# Patient Record
Sex: FEMALE | Race: WHITE | NOT HISPANIC OR LATINO | Employment: UNEMPLOYED | URBAN - METROPOLITAN AREA
[De-identification: names, ages, dates, MRNs, and addresses within clinical notes are randomized per-mention and may not be internally consistent; named-entity substitution may affect disease eponyms.]

---

## 2018-10-04 ENCOUNTER — HOSPITAL ENCOUNTER (EMERGENCY)
Facility: HOSPITAL | Age: 23
Discharge: HOME | End: 2018-10-04
Attending: EMERGENCY MEDICINE | Admitting: EMERGENCY MEDICINE

## 2018-10-04 VITALS
RESPIRATION RATE: 16 BRPM | HEART RATE: 78 BPM | SYSTOLIC BLOOD PRESSURE: 121 MMHG | OXYGEN SATURATION: 100 % | WEIGHT: 227 LBS | HEIGHT: 63 IN | DIASTOLIC BLOOD PRESSURE: 83 MMHG | BODY MASS INDEX: 40.22 KG/M2 | TEMPERATURE: 98.1 F

## 2018-10-04 DIAGNOSIS — R55 POSTURAL SYNCOPE: Primary | ICD-10-CM

## 2018-10-04 DIAGNOSIS — R00.2 PALPITATIONS: ICD-10-CM

## 2018-10-04 LAB
ALBUMIN SERPL-MCNC: 5.1 G/DL (ref 3.5–5.2)
ALBUMIN/GLOB SERPL: 1.6 G/DL
ALP SERPL-CCNC: 75 U/L (ref 39–117)
ALT SERPL W P-5'-P-CCNC: 19 U/L (ref 1–33)
ANION GAP SERPL CALCULATED.3IONS-SCNC: 11.2 MMOL/L
AST SERPL-CCNC: 16 U/L (ref 1–32)
BACTERIA UR QL AUTO: ABNORMAL /HPF
BASOPHILS # BLD AUTO: 0.02 10*3/MM3 (ref 0–0.2)
BASOPHILS NFR BLD AUTO: 0.2 % (ref 0–1.5)
BILIRUB SERPL-MCNC: 0.2 MG/DL (ref 0.1–1.2)
BILIRUB UR QL STRIP: NEGATIVE
BUN BLD-MCNC: 13 MG/DL (ref 6–20)
BUN/CREAT SERPL: 19.1 (ref 7–25)
CALCIUM SPEC-SCNC: 9.8 MG/DL (ref 8.6–10.5)
CHLORIDE SERPL-SCNC: 101 MMOL/L (ref 98–107)
CLARITY UR: CLEAR
CO2 SERPL-SCNC: 27.8 MMOL/L (ref 22–29)
COLOR UR: YELLOW
CREAT BLD-MCNC: 0.68 MG/DL (ref 0.57–1)
DEPRECATED RDW RBC AUTO: 39.6 FL (ref 37–54)
EOSINOPHIL # BLD AUTO: 0.09 10*3/MM3 (ref 0–0.7)
EOSINOPHIL NFR BLD AUTO: 0.9 % (ref 0.3–6.2)
ERYTHROCYTE [DISTWIDTH] IN BLOOD BY AUTOMATED COUNT: 12.3 % (ref 11.7–13)
GFR SERPL CREATININE-BSD FRML MDRD: 108 ML/MIN/1.73
GLOBULIN UR ELPH-MCNC: 3.2 GM/DL
GLUCOSE BLD-MCNC: 108 MG/DL (ref 65–99)
GLUCOSE UR STRIP-MCNC: NEGATIVE MG/DL
HCG SERPL QL: NEGATIVE
HCT VFR BLD AUTO: 41.2 % (ref 35.6–45.5)
HGB BLD-MCNC: 13.9 G/DL (ref 11.9–15.5)
HGB UR QL STRIP.AUTO: NEGATIVE
HOLD SPECIMEN: NORMAL
HOLD SPECIMEN: NORMAL
HYALINE CASTS UR QL AUTO: ABNORMAL /LPF
IMM GRANULOCYTES # BLD: 0.03 10*3/MM3 (ref 0–0.03)
IMM GRANULOCYTES NFR BLD: 0.3 % (ref 0–0.5)
KETONES UR QL STRIP: NEGATIVE
LEUKOCYTE ESTERASE UR QL STRIP.AUTO: ABNORMAL
LYMPHOCYTES # BLD AUTO: 3.21 10*3/MM3 (ref 0.9–4.8)
LYMPHOCYTES NFR BLD AUTO: 31.9 % (ref 19.6–45.3)
MCH RBC QN AUTO: 29.3 PG (ref 26.9–32)
MCHC RBC AUTO-ENTMCNC: 33.7 G/DL (ref 32.4–36.3)
MCV RBC AUTO: 86.7 FL (ref 80.5–98.2)
MONOCYTES # BLD AUTO: 0.56 10*3/MM3 (ref 0.2–1.2)
MONOCYTES NFR BLD AUTO: 5.6 % (ref 5–12)
NEUTROPHILS # BLD AUTO: 6.17 10*3/MM3 (ref 1.9–8.1)
NEUTROPHILS NFR BLD AUTO: 61.4 % (ref 42.7–76)
NITRITE UR QL STRIP: NEGATIVE
PH UR STRIP.AUTO: 7 [PH] (ref 5–8)
PLATELET # BLD AUTO: 339 10*3/MM3 (ref 140–500)
PMV BLD AUTO: 9.5 FL (ref 6–12)
POTASSIUM BLD-SCNC: 4.2 MMOL/L (ref 3.5–5.2)
PROT SERPL-MCNC: 8.3 G/DL (ref 6–8.5)
PROT UR QL STRIP: NEGATIVE
RBC # BLD AUTO: 4.75 10*6/MM3 (ref 3.9–5.2)
RBC # UR: ABNORMAL /HPF
REF LAB TEST METHOD: ABNORMAL
SODIUM BLD-SCNC: 140 MMOL/L (ref 136–145)
SP GR UR STRIP: 1.01 (ref 1–1.03)
SQUAMOUS #/AREA URNS HPF: ABNORMAL /HPF
UROBILINOGEN UR QL STRIP: ABNORMAL
WBC NRBC COR # BLD: 10.05 10*3/MM3 (ref 4.5–10.7)
WBC UR QL AUTO: ABNORMAL /HPF
WHOLE BLOOD HOLD SPECIMEN: NORMAL
WHOLE BLOOD HOLD SPECIMEN: NORMAL

## 2018-10-04 PROCEDURE — 93010 ELECTROCARDIOGRAM REPORT: CPT | Performed by: INTERNAL MEDICINE

## 2018-10-04 PROCEDURE — 99284 EMERGENCY DEPT VISIT MOD MDM: CPT

## 2018-10-04 PROCEDURE — 87086 URINE CULTURE/COLONY COUNT: CPT | Performed by: PHYSICIAN ASSISTANT

## 2018-10-04 PROCEDURE — 80053 COMPREHEN METABOLIC PANEL: CPT | Performed by: PHYSICIAN ASSISTANT

## 2018-10-04 PROCEDURE — 85025 COMPLETE CBC W/AUTO DIFF WBC: CPT | Performed by: PHYSICIAN ASSISTANT

## 2018-10-04 PROCEDURE — 81001 URINALYSIS AUTO W/SCOPE: CPT | Performed by: PHYSICIAN ASSISTANT

## 2018-10-04 PROCEDURE — 93005 ELECTROCARDIOGRAM TRACING: CPT | Performed by: PHYSICIAN ASSISTANT

## 2018-10-04 PROCEDURE — 84703 CHORIONIC GONADOTROPIN ASSAY: CPT | Performed by: PHYSICIAN ASSISTANT

## 2018-10-04 RX ORDER — CHOLECALCIFEROL (VITAMIN D3) 125 MCG
10 CAPSULE ORAL
COMMUNITY

## 2018-10-04 RX ORDER — SODIUM CHLORIDE 0.9 % (FLUSH) 0.9 %
10 SYRINGE (ML) INJECTION AS NEEDED
Status: DISCONTINUED | OUTPATIENT
Start: 2018-10-04 | End: 2018-10-05 | Stop reason: HOSPADM

## 2018-10-04 RX ADMIN — SODIUM CHLORIDE 1000 ML: 9 INJECTION, SOLUTION INTRAVENOUS at 21:36

## 2018-10-05 NOTE — DISCHARGE INSTRUCTIONS
Avoid caffeine.  Stay hydrated (8-10 glasses of water 8-10oz/day)  Return to the ER with any further concerns, should your condition reoccur, or should you develop a fever.

## 2018-10-05 NOTE — ED PROVIDER NOTES
" EMERGENCY DEPARTMENT ENCOUNTER    CHIEF COMPLAINT  Chief Complaint: syncope  History given by: Patient, patients friend  History limited by: none  Room Number: 14/14  PMD: Provider, No Known      HPI:  Pt is a 23 y.o. female who presents complaining of syncope that happened around noon when she stood up to go to the bathroom. Pt reports still feeling \"off\", and that this feeling has been consistent for couple of weeks but thought it was relating to stress. Pt reports feeling nauseous before incident, feeling weird like \"static on a TV\", palpitations, and edema in the legs that progresses as the day goes on . Pt denies hitting head, chest pain, blurred vision,  Pain in the extremities, and SOA. LMP was four months ago, pt reports HX of PCOS and seasonal asthma. Pt's friend reports patient was unconscious for one minute, and that pt was confused after incident.    Duration:  Several hours  Onset: gradual  Timing: one minute  Quality: feels like \"static on TV\"  Intensity/Severity: moderate  Progression: resolved  Associated Symptoms: nausea, feeling weird like \"static on a TV\", palpitations, and edema in the legs  Aggravating Factors: standing  Alleviating Factors: none  Previous Episodes: none  Treatment before arrival: none    PAST MEDICAL HISTORY  Active Ambulatory Problems     Diagnosis Date Noted   • No Active Ambulatory Problems     Resolved Ambulatory Problems     Diagnosis Date Noted   • No Resolved Ambulatory Problems     Past Medical History:   Diagnosis Date   • Asthma        PAST SURGICAL HISTORY  Past Surgical History:   Procedure Laterality Date   • TONSILLECTOMY         FAMILY HISTORY  History reviewed. No pertinent family history.    SOCIAL HISTORY  Social History     Social History   • Marital status: Single     Spouse name: N/A   • Number of children: N/A   • Years of education: N/A     Occupational History   • Not on file.     Social History Main Topics   • Smoking status: Former Smoker   • " Smokeless tobacco: Not on file   • Alcohol use No   • Drug use: No   • Sexual activity: Not on file     Other Topics Concern   • Not on file     Social History Narrative   • No narrative on file       ALLERGIES  Penicillins    REVIEW OF SYSTEMS  Review of Systems   Constitutional: Negative for fever.   HENT: Negative for sore throat.    Eyes: Negative.    Respiratory: Negative for cough and shortness of breath.    Cardiovascular: Positive for palpitations. Negative for chest pain.   Gastrointestinal: Positive for nausea. Negative for abdominal pain, diarrhea and vomiting.   Genitourinary: Negative for dysuria.   Musculoskeletal: Negative for neck pain.   Skin: Negative for rash.   Allergic/Immunologic: Negative.    Neurological: Positive for syncope. Negative for seizures, weakness, numbness and headaches.   Hematological: Negative.    Psychiatric/Behavioral: Negative.    All other systems reviewed and are negative.      PHYSICAL EXAM  ED Triage Vitals   Temp Heart Rate Resp BP SpO2   10/04/18 2048 10/04/18 2048 10/04/18 2048 10/04/18 2054 10/04/18 2048   98.1 °F (36.7 °C) 91 16 144/98 100 %      Temp src Heart Rate Source Patient Position BP Location FiO2 (%)   10/04/18 2048 10/04/18 2048 10/04/18 2054 10/04/18 2054 --   Tympanic Monitor Sitting Right arm        Physical Exam   Constitutional: She is oriented to person, place, and time. No distress.   HENT:   Head: Normocephalic and atraumatic.   Eyes: Pupils are equal, round, and reactive to light. EOM are normal.   Neck: Normal range of motion. Neck supple.   Cardiovascular: Normal rate, regular rhythm and normal heart sounds.    Pulmonary/Chest: Effort normal and breath sounds normal. No respiratory distress.   Abdominal: Soft. There is no tenderness. There is no rebound and no guarding.   Musculoskeletal: Normal range of motion. She exhibits no edema.   Neurological: She is alert and oriented to person, place, and time. She has normal sensation and normal  strength.   Skin: Skin is warm and dry. No rash noted.   Psychiatric: Mood and affect normal.   Nursing note and vitals reviewed.      LAB RESULTS  Lab Results (last 24 hours)     Procedure Component Value Units Date/Time    CBC & Differential [[id number]] Collected:  10/04/18 2109    Specimen:  Blood Updated:  10/04/18 2121    Narrative:       The following orders were created for panel order CBC & Differential.  Procedure                               Abnormality         Status                     ---------                               -----------         ------                     CBC Auto Differential[[id number]]         Normal              Final result                 Please view results for these tests on the individual orders.    Comprehensive Metabolic Panel [[id number]]  (Abnormal) Collected:  10/04/18 2109    Specimen:  Blood Updated:  10/04/18 2141     Glucose 108 (H) mg/dL      BUN 13 mg/dL      Creatinine 0.68 mg/dL      Sodium 140 mmol/L      Potassium 4.2 mmol/L      Chloride 101 mmol/L      CO2 27.8 mmol/L      Calcium 9.8 mg/dL      Total Protein 8.3 g/dL      Albumin 5.10 g/dL      ALT (SGPT) 19 U/L      AST (SGOT) 16 U/L      Alkaline Phosphatase 75 U/L      Total Bilirubin 0.2 mg/dL      eGFR Non African Amer 108 mL/min/1.73      Globulin 3.2 gm/dL      A/G Ratio 1.6 g/dL      BUN/Creatinine Ratio 19.1     Anion Gap 11.2 mmol/L     CBC Auto Differential [[id number]]  (Normal) Collected:  10/04/18 2109    Specimen:  Blood Updated:  10/04/18 2121     WBC 10.05 10*3/mm3      RBC 4.75 10*6/mm3      Hemoglobin 13.9 g/dL      Hematocrit 41.2 %      MCV 86.7 fL      MCH 29.3 pg      MCHC 33.7 g/dL      RDW 12.3 %      RDW-SD 39.6 fl      MPV 9.5 fL      Platelets 339 10*3/mm3      Neutrophil % 61.4 %      Lymphocyte % 31.9 %      Monocyte % 5.6 %      Eosinophil % 0.9 %      Basophil % 0.2 %      Immature Grans % 0.3 %      Neutrophils, Absolute 6.17 10*3/mm3      Lymphocytes, Absolute 3.21 10*3/mm3       Monocytes, Absolute 0.56 10*3/mm3      Eosinophils, Absolute 0.09 10*3/mm3      Basophils, Absolute 0.02 10*3/mm3      Immature Grans, Absolute 0.03 10*3/mm3     hCG, Serum, Qualitative [[id number]]  (Normal) Collected:  10/04/18 2217    Specimen:  Blood Updated:  10/04/18 2259     HCG Qualitative Negative    Urinalysis With Microscopic If Indicated (No Culture) - Urine, Clean Catch [[id number]]  (Abnormal) Collected:  10/04/18 2226    Specimen:  Urine from Urine, Clean Catch Updated:  10/04/18 2240     Color, UA Yellow     Appearance, UA Clear     pH, UA 7.0     Specific Gravity, UA 1.015     Glucose, UA Negative     Ketones, UA Negative     Bilirubin, UA Negative     Blood, UA Negative     Protein, UA Negative     Leuk Esterase, UA Moderate (2+) (A)     Nitrite, UA Negative     Urobilinogen, UA 0.2 E.U./dL    Urinalysis, Microscopic Only - Urine, Clean Catch [[id number]]  (Abnormal) Collected:  10/04/18 2226    Specimen:  Urine from Urine, Clean Catch Updated:  10/04/18 2240     RBC, UA 3-5 (A) /HPF      WBC, UA 6-12 (A) /HPF      Bacteria, UA 1+ (A) /HPF      Squamous Epithelial Cells, UA 3-6 (A) /HPF      Hyaline Casts, UA 0-2 /LPF      Methodology Automated Microscopy    Urine Culture - Urine, [[id number]] Collected:  10/04/18 2226    Specimen:  Urine from Urine, Clean Catch Updated:  10/04/18 2346          I ordered the above labs and reviewed the results      PROCEDURES  Procedures  See MD note for interpretation of EKG.    PROGRESS AND CONSULTS  9:46 PM  Reviewed pt's history and workup with Dr. Wren.  After a bedside evaluation; Dr Wren agrees with the plan of care    11:16 PM  Rechecked pt. Pt is resting comfortably. Notified pt of labs results. Discussed the plan to discharge.             MEDICAL DECISION MAKING  Results were reviewed/discussed with the patient and they were also made aware of online access. Pt also made aware that some labs, such as cultures, will not be resulted during ER visit and  follow up with PMD is necessary.     MDM  Number of Diagnoses or Management Options     Amount and/or Complexity of Data Reviewed  Clinical lab tests: ordered and reviewed (UA: WBC 6-12, Bacteria 1+. will culture)           DIAGNOSIS  Final diagnoses:   Postural syncope   Palpitations       DISPOSITION  DISCHARGE    Patient discharged in stable condition.    Reviewed implications of results, diagnosis, meds, responsibility to follow up, warning signs and symptoms of possible worsening, potential complications and reasons to return to ER.    Patient/Family voiced understanding of above instructions.    Discussed plan for discharge, as there is no emergent indication for admission. Patient referred to primary care provider for BP management due to today's BP. Pt/family is agreeable and understands need for follow up and repeat testing.  Pt is aware that discharge does not mean that nothing is wrong but it indicates no emergency is present that requires admission and they must continue care with follow-up as given below or physician of their choice.     FOLLOW-UP  [facility] CARDIOLOG[facility]  [address]  [facility] [id number] [phone]  Schedule an appointment as soon as possible for a visit   For further evaluation and treatment         Medication List      No changes were made to your prescriptions during this visit.           Latest Documented Vital Signs:  As of 5:39 AM  BP- 121/83 HR- 78 Temp- 98.1 °F (36.7 °C) (Tympanic) O2 sat- 100%    --  Documentation assistance provided by yasmeen Patel for Jose Jerome PA-C.  Information recorded by the yasmeen was done at my direction and has been verified and validated by me.     Jazmyn Patel  10/04/18 3197       Jazmyn Patel  10/04/18 6167       Gorge Jerome III, PA  10/05/18 5745

## 2018-10-05 NOTE — ED PROVIDER NOTES
"Pt states that this morning, when she awoke, she felt unwell, lightheaded, and nauseated. She reports that after she stood from her chair, her sx worsened and then she had a syncopal episode that was witnessed by family (did not sustain head injury or any other trauma). After the episode, she had brief \"irregular\" palpitations that have since resolved. She denies abd pain, vomiting, diarrhea, sweating, pain and difficulty with urination, black or bloody stools, headache, focal weakness, numbness, chest pain, and dyspnea.      Limited physical exam: heart is regular rate and rhythm, lungs are CTAB, abd is soft and nontender without rebound or guarding, nonfocal neuro exam    EKG           EKG time: 2123  Rhythm/Rate: sinus rhythm, rate= 71  P waves and WY: normal  QRS, axis: normal   ST and T waves: normal   QT is not prolonged; WY interval is not short; no signs of Brugada syndrome    Interpreted Contemporaneously by me, independently viewed  unchanged compared to prior 11/17/11    Plan is to administer IV fluids for hydration, to check orthostatic vitals, and to order blood work, UA, EKG, and HCG qualitative for further evaluation-> Orthostatic vitals are unremarkable, WBC count is normal, hgb is normal, and blood work is otherwise stable. Will likely discharge pt and have her follow up with PMD for recheck if she is able to ambulate in ED without complication.       MD ATTESTATION NOTE    The AUDELIA and I have discussed this patient's history, physical exam, and treatment plan.  I have reviewed the documentation and personally had a face to face interaction with the patient. I affirm the documentation and agree with the treatment and plan.  The attached note describes my personal findings.      Documentation assistance provided by yasmeen Flores for Dr Wren. Information recorded by the yasmeen was done at my direction and has been verified and validated by me.      Otoniel Flores  10/04/18 2202       Holger, " MD Chencho  10/04/18 3729

## 2018-10-05 NOTE — ED NOTES
Pt. States that she was feeling naueus around noon and then when she was getting up because she felt she was going to throw up she fell.     Holli Mcwilliams RN  10/04/18 1639

## 2018-10-06 LAB — BACTERIA SPEC AEROBE CULT: NORMAL

## 2018-10-10 ENCOUNTER — HOSPITAL ENCOUNTER (EMERGENCY)
Facility: HOSPITAL | Age: 23
Discharge: HOME | End: 2018-10-10
Attending: EMERGENCY MEDICINE | Admitting: EMERGENCY MEDICINE

## 2018-10-10 VITALS
HEART RATE: 87 BPM | RESPIRATION RATE: 18 BRPM | DIASTOLIC BLOOD PRESSURE: 84 MMHG | OXYGEN SATURATION: 99 % | HEIGHT: 63 IN | WEIGHT: 216.2 LBS | TEMPERATURE: 97.8 F | SYSTOLIC BLOOD PRESSURE: 133 MMHG | BODY MASS INDEX: 38.31 KG/M2

## 2018-10-10 DIAGNOSIS — R45.1 STRESS REACTION WITH PSYCHOMOTOR AGITATION: Primary | ICD-10-CM

## 2018-10-10 DIAGNOSIS — F43.9 STRESS REACTION WITH PSYCHOMOTOR AGITATION: Primary | ICD-10-CM

## 2018-10-10 LAB
HOLD SPECIMEN: NORMAL
HOLD SPECIMEN: NORMAL
WHOLE BLOOD HOLD SPECIMEN: NORMAL
WHOLE BLOOD HOLD SPECIMEN: NORMAL

## 2018-10-10 PROCEDURE — 99283 EMERGENCY DEPT VISIT LOW MDM: CPT

## 2018-10-10 PROCEDURE — 93010 ELECTROCARDIOGRAM REPORT: CPT | Performed by: INTERNAL MEDICINE

## 2018-10-10 PROCEDURE — 93005 ELECTROCARDIOGRAM TRACING: CPT | Performed by: EMERGENCY MEDICINE

## 2018-10-10 RX ORDER — SODIUM CHLORIDE 0.9 % (FLUSH) 0.9 %
10 SYRINGE (ML) INJECTION AS NEEDED
Status: DISCONTINUED | OUTPATIENT
Start: 2018-10-10 | End: 2018-10-10 | Stop reason: HOSPADM

## 2018-10-10 NOTE — ED PROVIDER NOTES
EMERGENCY DEPARTMENT ENCOUNTER    CHIEF COMPLAINT  Chief Complaint: chest pain  History given by: Patient  History limited by: N/A  Room Number:   PMD: Provider, No Known      HPI:  Pt is a 23 y.o. female who presents complaining of midsternal chest pain which began PTA after getting agitated while working on a home-improvement project with her boyfriend.  Pt reports she became agitated with her boyfriend after he incorrectly placed a baseboard.  Pt reports recent history of episodes of agitation with increase in HR, tunnel vision, lightheadedness, and chest pain.  Pt reports these sx have resolved.  Pt reports she was seen in ER 10/4 for syncope and was told upon discharge to return to ER with chest pain.  Pt reports she is currently asymptomatic.    Duration:  PTA  Onset: gradual  Timin episode  Location: midsternal  Radiation: none  Quality: sharp pain  Intensity/Severity: moderate  Progression: resolved  Associated Symptoms: agitation, tunnel vision, lightheadedness with episode, resolved  Aggravating Factors: agitation  Alleviating Factors: none  Previous Episodes: Pt reports recent history of episodes of agitation with increase in HR, tunnel vision, lightheadedness, and chest pain.   Treatment before arrival: none    PAST MEDICAL HISTORY  Active Ambulatory Problems     Diagnosis Date Noted   • No Active Ambulatory Problems     Resolved Ambulatory Problems     Diagnosis Date Noted   • No Resolved Ambulatory Problems     Past Medical History:   Diagnosis Date   • Asthma    • PCOS (polycystic ovarian syndrome)        PAST SURGICAL HISTORY  Past Surgical History:   Procedure Laterality Date   • TONSILLECTOMY         FAMILY HISTORY  History reviewed. No pertinent family history.    SOCIAL HISTORY  Social History     Social History   • Marital status: Single     Spouse name: N/A   • Number of children: N/A   • Years of education: N/A     Occupational History   • Not on file.     Social History Main Topics    • Smoking status: Former Smoker   • Smokeless tobacco: Not on file   • Alcohol use Yes      Comment: rarely    • Drug use: No   • Sexual activity: Yes     Other Topics Concern   • Not on file     Social History Narrative   • No narrative on file       ALLERGIES  Penicillins    REVIEW OF SYSTEMS  Review of Systems   Constitutional: Negative for fever.   HENT: Negative for sore throat.    Eyes: Positive for visual disturbance (tunnel vision with episode of agitation, resolved).   Respiratory: Negative for cough and shortness of breath.    Cardiovascular: Positive for chest pain (midsternal, sharp, with episode of agitation, resolved).   Gastrointestinal: Negative for abdominal pain, diarrhea and vomiting.   Genitourinary: Negative for dysuria.   Musculoskeletal: Negative for neck pain.   Skin: Negative for rash.   Allergic/Immunologic: Negative.    Neurological: Positive for light-headedness (with episode of agitation, resolved). Negative for weakness, numbness and headaches.   Hematological: Negative.    Psychiatric/Behavioral: Positive for agitation.   All other systems reviewed and are negative.      PHYSICAL EXAM  ED Triage Vitals [10/10/18 1349]   Temp Heart Rate Resp BP SpO2   97.8 °F (36.6 °C) 90 18 -- 99 %      Temp src Heart Rate Source Patient Position BP Location FiO2 (%)   Tympanic Monitor -- -- --       Physical Exam   Constitutional: She is oriented to person, place, and time. No distress.   HENT:   Head: Normocephalic and atraumatic.   Eyes: Pupils are equal, round, and reactive to light. EOM are normal.   Neck: Normal range of motion. Neck supple.   Cardiovascular: Normal rate, regular rhythm and normal heart sounds.    No murmur heard.  Pulmonary/Chest: Effort normal and breath sounds normal. No respiratory distress.   Abdominal: Soft. There is no tenderness. There is no rebound and no guarding.   Musculoskeletal: Normal range of motion. She exhibits no edema.   Neurological: She is alert and  oriented to person, place, and time. She has normal sensation and normal strength.   Skin: Skin is warm and dry. No rash noted.   Psychiatric: Mood and affect normal.   Nursing note and vitals reviewed.      PROCEDURES  Procedures    EKG           EKG time: 1052  Rhythm/Rate: NSR, 69  P waves and MS: normal  QRS, axis: normal   ST and T waves: normal     Interpreted Contemporaneously by me, independently viewed  unchanged compared to prior 10/4/18      PROGRESS AND CONSULTS     1417  Initial encounter. Discussed with pt plan to perform EKG for cardiac evaluation, but pt had full unremarkable workup last week, so no need to repeat labs. Counseled pt in f/u with PCP for stress management strategies.  Pt understands and agrees with the plan, all questions answered.    1419  Ordered EKG.    1514  Pt's EKG is unchanged. Pt resting comfortably and asymptomatic.  Plan for discharge, advised pt to establish PCP and f/u. Pt understands and agrees with the plan, all questions answered.      MEDICAL DECISION MAKING  Results were reviewed/discussed with the patient and they were also made aware of online access. Pt also made aware that some labs, such as cultures, will not be resulted during ER visit and follow up with PMD is necessary.     MDM  Number of Diagnoses or Management Options     Amount and/or Complexity of Data Reviewed  Tests in the medicine section of CPT®: ordered and reviewed (See EKG in procedure note.)  Decide to obtain previous medical records or to obtain history from someone other than the patient: yes  Review and summarize past medical records: yes (Pt here 10/4/18 for syncope and palpitations.  Her orthostatic vitals, labs, and EKG were unremarkable at that time.)    Patient Progress  Patient progress: stable         DIAGNOSIS  Final diagnoses:   Stress reaction with psychomotor agitation       DISPOSITION  DISCHARGE    Patient discharged in stable condition.    Reviewed implications of results,  diagnosis, meds, responsibility to follow up, warning signs and symptoms of possible worsening, potential complications and reasons to return to ER.    Patient/Family voiced understanding of above instructions.    Discussed plan for discharge, as there is no emergent indication for admission. Patient referred to primary care provider for BP management due to today's BP. Pt/family is agreeable and understands need for follow up and repeat testing.  Pt is aware that discharge does not mean that nothing is wrong but it indicates no emergency is present that requires admission and they must continue care with follow-up as given below or physician of their choice.     FOLLOW-UP  PATIENT LIAISON [facility] [address] [phone]  Schedule an appointment as soon as possible for a visit       [facility]  [address] [facility] [address] [phone]    As needed         Medication List      No changes were made to your prescriptions during this visit.           Latest Documented Vital Signs:  As of 3:17 PM  BP- 133/84 HR- 87 Temp- 97.8 °F (36.6 °C) (Tympanic) O2 sat- 99%    --  Documentation assistance provided by yasmeen Gaspar for Dr. Clements.  Information recorded by the scribe was done at my direction and has been verified and validated by me.          Lucero Gaspar  10/10/18 8424       Armond Clements MD  10/10/18 3458

## 2018-10-10 NOTE — ED NOTES
Pt to ED with c/o RUQ pain with deep palpation. Epigastric pain and midsternal chest pressure since 30 min PTA. Intermittent sharp pains, RUQ pain worse after eating greasy foods, HA's x 1 month to face and frontal area.      Britany Lopez, RN  10/10/18 3520

## 2018-10-23 ENCOUNTER — OFFICE VISIT (OUTPATIENT)
Dept: CARDIOLOGY | Facility: CLINIC | Age: 23
End: 2018-10-23

## 2018-10-23 VITALS
WEIGHT: 219 LBS | HEIGHT: 63 IN | SYSTOLIC BLOOD PRESSURE: 122 MMHG | HEART RATE: 78 BPM | BODY MASS INDEX: 38.8 KG/M2 | DIASTOLIC BLOOD PRESSURE: 80 MMHG

## 2018-10-23 DIAGNOSIS — R00.2 PALPITATIONS: ICD-10-CM

## 2018-10-23 DIAGNOSIS — R06.02 SOB (SHORTNESS OF BREATH): Primary | ICD-10-CM

## 2018-10-23 DIAGNOSIS — R55 SYNCOPE AND COLLAPSE: ICD-10-CM

## 2018-10-23 PROCEDURE — 99204 OFFICE O/P NEW MOD 45 MIN: CPT | Performed by: INTERNAL MEDICINE

## 2018-10-23 PROCEDURE — 93000 ELECTROCARDIOGRAM COMPLETE: CPT | Performed by: INTERNAL MEDICINE

## 2018-10-23 NOTE — PROGRESS NOTES
Subjective:     Encounter Date:10/23/2018      Patient ID: Alejandrina Prieto is a 23 y.o. female.    Chief Complaint: syncope  History of Present Illness      This patient comes to the office today for follow-up after recent emergency room evaluation.  She presented there on October 4 after an episode of syncope.  She been standing up for a while and started feeling like her heart was beating harder.  She felt hot and she felt nauseated.  She then started to sit down and then she actually passed out.  She woke right back up.  She did not have any loss of bowel or bladder control.  No seizure activity.  Evaluation in the emergency room was unremarkable.  She was scheduled to see us.  She was also instructed to find a primary care physician for follow-up.  She was instructed that she had any chest pain, back to the emergency room.  On the 10th she came back to the emergency room because she had some right upper quadrant discomfort.  They got an EKG but no further blood work at the time.  She was again instructed follow-up with her primary care physician.    She's been trying to identify primary care but she hasn't found one year.  They gave her a list to call and she's been trying individual physician offices but has not found one yet.    She does have some dizziness at times.  She's been feeling her heart going fast at times.  She has a sleep number bed that tracks her heart rate-giving an average heart rate of 86 bpm.  She says that is higher than it used to be and that seems high to her.  It does not give her arrange around that number.  There are will at tell her what her most rapid or slow heart rate is.  She has been having some more shortness of breath.  No orthopnea or PND.  No shortness of breath at rest.  She just gets more short of breath when she is up doing things.    She has not had any further episodes of syncope.  Occasionally she has some right upper quadrant discomfort but no chest discomfort.   She's been feeling very fatigued.  She just doesn't have the energy that she used to have.  She does note that she's also been having worsening swelling in both lower extremities, more so on the right.    The following portions of the patient's history were reviewed and updated as appropriate: allergies, current medications, past family history, past medical history, past social history, past surgical history and problem list.    Past Medical History:   Diagnosis Date   • Asthma    • PCOS (polycystic ovarian syndrome)    • Stress reaction with psychomotor agitation        Past Surgical History:   Procedure Laterality Date   • TONSILLECTOMY         Social History     Social History   • Marital status: Single     Spouse name: N/A   • Number of children: N/A   • Years of education: N/A     Occupational History   • Not on file.     Social History Main Topics   • Smoking status: Former Smoker   • Smokeless tobacco: Not on file      Comment: caff use   • Alcohol use Yes      Comment: rarely    • Drug use: No   • Sexual activity: Yes     Other Topics Concern   • Not on file     Social History Narrative   • No narrative on file       Review of Systems   Constitution: Positive for malaise/fatigue. Negative for chills, decreased appetite, fever and night sweats.   HENT: Negative for ear discharge, ear pain, hearing loss, nosebleeds and sore throat.    Eyes: Negative for blurred vision, double vision and pain.   Cardiovascular: Negative for cyanosis.   Respiratory: Negative for hemoptysis and sputum production.    Endocrine: Negative for cold intolerance and heat intolerance.   Hematologic/Lymphatic: Negative for adenopathy.   Skin: Negative for dry skin, itching, nail changes, rash and suspicious lesions.   Musculoskeletal: Negative for arthritis, gout, muscle cramps, muscle weakness, myalgias and neck pain.   Gastrointestinal: Negative for anorexia, bowel incontinence, constipation, diarrhea, dysphagia, hematemesis and  "jaundice.   Genitourinary: Negative for bladder incontinence, dysuria, flank pain, frequency, hematuria and nocturia.   Neurological: Positive for headaches. Negative for focal weakness, numbness, paresthesias and seizures.   Psychiatric/Behavioral: Negative for altered mental status, hallucinations, hypervigilance, suicidal ideas and thoughts of violence.   Allergic/Immunologic: Negative for persistent infections.         ECG 12 Lead  Date/Time: 10/23/2018 11:04 AM  Performed by: TRAM LAND III  Authorized by: TRAM LAND III   Comparison: compared with previous ECG   Similar to previous ECG  Rhythm: sinus rhythm  Rate: normal  Conduction: conduction normal  ST Segments: ST segments normal  T Waves: T waves normal  QRS axis: normal  Other: no other findings  Clinical impression: normal ECG               Objective:     Vitals:    10/23/18 1016   BP: 122/80   Pulse: 78   Weight: 99.3 kg (219 lb)   Height: 160 cm (63\")         Physical Exam   Constitutional: She is oriented to person, place, and time. She appears well-developed and well-nourished. No distress.   HENT:   Head: Normocephalic and atraumatic.   Nose: Nose normal.   Mouth/Throat: Oropharynx is clear and moist.   Eyes: Pupils are equal, round, and reactive to light. Conjunctivae and EOM are normal. Right eye exhibits no discharge. Left eye exhibits no discharge.   Neck: Normal range of motion. Neck supple. No tracheal deviation present. No thyromegaly present.   Cardiovascular: Normal rate, regular rhythm, S1 normal, S2 normal, normal heart sounds and normal pulses.  Exam reveals no S3.    Pulmonary/Chest: Effort normal and breath sounds normal. No stridor. No respiratory distress. She exhibits no tenderness.   Abdominal: Soft. Bowel sounds are normal. She exhibits no distension and no mass. There is no tenderness. There is no rebound and no guarding.   Musculoskeletal: Normal range of motion. She exhibits no tenderness or deformity. "   Lymphadenopathy:     She has no cervical adenopathy.   Neurological: She is alert and oriented to person, place, and time. She has normal reflexes.   Skin: Skin is warm and dry. No rash noted. She is not diaphoretic. No erythema.   Psychiatric: She has a normal mood and affect. Thought content normal.       Lab Review:             Performed        Assessment:          Diagnosis Plan   1. SOB (shortness of breath)  Adult Transthoracic Echo Complete W/ Cont if Necessary Per Protocol    Holter Monitor - 24 Hour   2. Palpitations  Adult Transthoracic Echo Complete W/ Cont if Necessary Per Protocol    Holter Monitor - 24 Hour   3. Syncope and collapse  Adult Transthoracic Echo Complete W/ Cont if Necessary Per Protocol    Holter Monitor - 24 Hour          Plan:       1.  Syncope, with sensation of palpitations and tachycardia-we will place a Holter monitor  2.  Shortness breath-an echocardiogram will be obtained  3.  Fatigue-we will await the above results and proceed accordingly.    Thank you very much for allowing us to participate in the care of this pleasant patient.  Please don't hesitate to call if I can be of assistance in any way.      Current Outpatient Prescriptions:   •  aspirin (DANICA ASPIRIN EXTRA STRENGTH) 500 MG tablet, Take 500 mg by mouth Every 6 (Six) Hours As Needed for Mild Pain ., Disp: , Rfl:   •  melatonin 5 MG tablet tablet, Take 10 mg by mouth., Disp: , Rfl:

## 2018-11-05 ENCOUNTER — HOSPITAL ENCOUNTER (OUTPATIENT)
Dept: CARDIOLOGY | Facility: HOSPITAL | Age: 23
Discharge: HOME | End: 2018-11-05
Attending: INTERNAL MEDICINE | Admitting: INTERNAL MEDICINE

## 2018-11-05 VITALS
HEIGHT: 63 IN | HEART RATE: 102 BPM | SYSTOLIC BLOOD PRESSURE: 130 MMHG | BODY MASS INDEX: 38.8 KG/M2 | WEIGHT: 219 LBS | DIASTOLIC BLOOD PRESSURE: 82 MMHG

## 2018-11-05 DIAGNOSIS — R00.2 PALPITATIONS: ICD-10-CM

## 2018-11-05 DIAGNOSIS — R55 SYNCOPE AND COLLAPSE: ICD-10-CM

## 2018-11-05 DIAGNOSIS — R06.02 SOB (SHORTNESS OF BREATH): ICD-10-CM

## 2018-11-05 PROCEDURE — 93306 TTE W/DOPPLER COMPLETE: CPT | Performed by: INTERNAL MEDICINE

## 2018-11-05 PROCEDURE — 25010000002 PERFLUTREN (DEFINITY) 8.476 MG IN SODIUM CHLORIDE 0.9 % 10 ML INJECTION: Performed by: INTERNAL MEDICINE

## 2018-11-05 PROCEDURE — 93306 TTE W/DOPPLER COMPLETE: CPT

## 2018-11-05 RX ADMIN — PERFLUTREN 2 ML: 6.52 INJECTION, SUSPENSION INTRAVENOUS at 13:57

## 2018-11-06 LAB
ASCENDING AORTA: 2.2 CM
BH CV ECHO MEAS - ACS: 1.9 CM
BH CV ECHO MEAS - AO MAX PG (FULL): 3.2 MMHG
BH CV ECHO MEAS - AO MAX PG: 8.6 MMHG
BH CV ECHO MEAS - AO MEAN PG (FULL): 2.3 MMHG
BH CV ECHO MEAS - AO MEAN PG: 5.6 MMHG
BH CV ECHO MEAS - AO ROOT AREA (BSA CORRECTED): 1.3
BH CV ECHO MEAS - AO ROOT AREA: 5 CM^2
BH CV ECHO MEAS - AO ROOT DIAM: 2.5 CM
BH CV ECHO MEAS - AO V2 MAX: 146.7 CM/SEC
BH CV ECHO MEAS - AO V2 MEAN: 113.5 CM/SEC
BH CV ECHO MEAS - AO V2 VTI: 32.8 CM
BH CV ECHO MEAS - ASC AORTA: 2.2 CM
BH CV ECHO MEAS - AVA(I,A): 2.2 CM^2
BH CV ECHO MEAS - AVA(I,D): 2.2 CM^2
BH CV ECHO MEAS - AVA(V,A): 2.3 CM^2
BH CV ECHO MEAS - AVA(V,D): 2.3 CM^2
BH CV ECHO MEAS - BSA(HAYCOCK): 2.2 M^2
BH CV ECHO MEAS - BSA: 2 M^2
BH CV ECHO MEAS - BZI_BMI: 38.8 KILOGRAMS/M^2
BH CV ECHO MEAS - BZI_METRIC_HEIGHT: 160 CM
BH CV ECHO MEAS - BZI_METRIC_WEIGHT: 99.3 KG
BH CV ECHO MEAS - EDV(MOD-SP2): 105 ML
BH CV ECHO MEAS - EDV(MOD-SP4): 116 ML
BH CV ECHO MEAS - EDV(TEICH): 110.6 ML
BH CV ECHO MEAS - EF(CUBED): 78.4 %
BH CV ECHO MEAS - EF(MOD-BP): 65 %
BH CV ECHO MEAS - EF(MOD-SP2): 63.8 %
BH CV ECHO MEAS - EF(MOD-SP4): 67.2 %
BH CV ECHO MEAS - EF(TEICH): 70.5 %
BH CV ECHO MEAS - ESV(MOD-SP2): 38 ML
BH CV ECHO MEAS - ESV(MOD-SP4): 38 ML
BH CV ECHO MEAS - ESV(TEICH): 32.6 ML
BH CV ECHO MEAS - IVS/LVPW: 1.1
BH CV ECHO MEAS - IVSD: 1.1 CM
BH CV ECHO MEAS - LAT PEAK E' VEL: 13 CM/SEC
BH CV ECHO MEAS - LV DIASTOLIC VOL/BSA (35-75): 57.7 ML/M^2
BH CV ECHO MEAS - LV MASS(C)D: 178.3 GRAMS
BH CV ECHO MEAS - LV MASS(C)DI: 88.7 GRAMS/M^2
BH CV ECHO MEAS - LV MAX PG: 5.5 MMHG
BH CV ECHO MEAS - LV MEAN PG: 3.3 MMHG
BH CV ECHO MEAS - LV SYSTOLIC VOL/BSA (12-30): 18.9 ML/M^2
BH CV ECHO MEAS - LV V1 MAX: 116.7 CM/SEC
BH CV ECHO MEAS - LV V1 MEAN: 85.8 CM/SEC
BH CV ECHO MEAS - LV V1 VTI: 25.4 CM
BH CV ECHO MEAS - LVIDD: 4.9 CM
BH CV ECHO MEAS - LVIDS: 2.9 CM
BH CV ECHO MEAS - LVLD AP2: 8 CM
BH CV ECHO MEAS - LVLD AP4: 8.2 CM
BH CV ECHO MEAS - LVLS AP2: 6.4 CM
BH CV ECHO MEAS - LVLS AP4: 6.9 CM
BH CV ECHO MEAS - LVOT AREA (M): 2.8 CM^2
BH CV ECHO MEAS - LVOT AREA: 2.9 CM^2
BH CV ECHO MEAS - LVOT DIAM: 1.9 CM
BH CV ECHO MEAS - LVPWD: 0.97 CM
BH CV ECHO MEAS - MED PEAK E' VEL: 12 CM/SEC
BH CV ECHO MEAS - MV A DUR: 0.1 SEC
BH CV ECHO MEAS - MV A MAX VEL: 54.3 CM/SEC
BH CV ECHO MEAS - MV DEC SLOPE: 444.1 CM/SEC^2
BH CV ECHO MEAS - MV DEC TIME: 0.13 SEC
BH CV ECHO MEAS - MV E MAX VEL: 106 CM/SEC
BH CV ECHO MEAS - MV E/A: 2
BH CV ECHO MEAS - MV MAX PG: 5.3 MMHG
BH CV ECHO MEAS - MV MEAN PG: 2.1 MMHG
BH CV ECHO MEAS - MV P1/2T MAX VEL: 124.9 CM/SEC
BH CV ECHO MEAS - MV P1/2T: 82.4 MSEC
BH CV ECHO MEAS - MV V2 MAX: 114.4 CM/SEC
BH CV ECHO MEAS - MV V2 MEAN: 65 CM/SEC
BH CV ECHO MEAS - MV V2 VTI: 29.5 CM
BH CV ECHO MEAS - MVA P1/2T LCG: 1.8 CM^2
BH CV ECHO MEAS - MVA(P1/2T): 2.7 CM^2
BH CV ECHO MEAS - MVA(VTI): 2.5 CM^2
BH CV ECHO MEAS - PA ACC TIME: 0.1 SEC
BH CV ECHO MEAS - PA MAX PG (FULL): 3.1 MMHG
BH CV ECHO MEAS - PA MAX PG: 6 MMHG
BH CV ECHO MEAS - PA PR(ACCEL): 36.2 MMHG
BH CV ECHO MEAS - PA V2 MAX: 122.2 CM/SEC
BH CV ECHO MEAS - PULM A REVS DUR: 0.1 SEC
BH CV ECHO MEAS - PULM A REVS VEL: 23.3 CM/SEC
BH CV ECHO MEAS - PULM DIAS VEL: 57.6 CM/SEC
BH CV ECHO MEAS - PULM S/D: 1
BH CV ECHO MEAS - PULM SYS VEL: 59.5 CM/SEC
BH CV ECHO MEAS - PVA(V,A): 2 CM^2
BH CV ECHO MEAS - PVA(V,D): 2 CM^2
BH CV ECHO MEAS - QP/QS: 0.83
BH CV ECHO MEAS - RAP SYSTOLE: 3 MMHG
BH CV ECHO MEAS - RV MAX PG: 2.9 MMHG
BH CV ECHO MEAS - RV MEAN PG: 2 MMHG
BH CV ECHO MEAS - RV V1 MAX: 84.9 CM/SEC
BH CV ECHO MEAS - RV V1 MEAN: 68.1 CM/SEC
BH CV ECHO MEAS - RV V1 VTI: 20.9 CM
BH CV ECHO MEAS - RVOT AREA: 2.9 CM^2
BH CV ECHO MEAS - RVOT DIAM: 1.9 CM
BH CV ECHO MEAS - RVSP: 25 MMHG
BH CV ECHO MEAS - SI(AO): 81.7 ML/M^2
BH CV ECHO MEAS - SI(CUBED): 44.7 ML/M^2
BH CV ECHO MEAS - SI(LVOT): 36.3 ML/M^2
BH CV ECHO MEAS - SI(MOD-SP2): 33.3 ML/M^2
BH CV ECHO MEAS - SI(MOD-SP4): 38.8 ML/M^2
BH CV ECHO MEAS - SI(TEICH): 38.8 ML/M^2
BH CV ECHO MEAS - SV(AO): 164.3 ML
BH CV ECHO MEAS - SV(CUBED): 89.9 ML
BH CV ECHO MEAS - SV(LVOT): 72.9 ML
BH CV ECHO MEAS - SV(MOD-SP2): 67 ML
BH CV ECHO MEAS - SV(MOD-SP4): 78 ML
BH CV ECHO MEAS - SV(RVOT): 60.7 ML
BH CV ECHO MEAS - SV(TEICH): 78 ML
BH CV ECHO MEAS - TAPSE (>1.6): 2.4 CM2
BH CV ECHO MEAS - TR MAX VEL: 234.3 CM/SEC
BH CV ECHO MEASUREMENTS AVERAGE E/E' RATIO: 8.48
BH CV XLRA - RV BASE: 2.9 CM
BH CV XLRA - TDI S': 11 CM/SEC
LEFT ATRIUM VOLUME INDEX: 23 ML/M2
LV EF 2D ECHO EST: 65 %
MAXIMAL PREDICTED HEART RATE: 198 BPM
SINUS: 2.5 CM
STJ: 2.1 CM
STRESS TARGET HR: 168 BPM